# Patient Record
Sex: FEMALE | Race: WHITE
[De-identification: names, ages, dates, MRNs, and addresses within clinical notes are randomized per-mention and may not be internally consistent; named-entity substitution may affect disease eponyms.]

---

## 2018-07-26 ENCOUNTER — HOSPITAL ENCOUNTER (EMERGENCY)
Dept: HOSPITAL 92 - ERS | Age: 23
Discharge: HOME | End: 2018-07-26
Payer: COMMERCIAL

## 2018-07-26 DIAGNOSIS — F41.9: ICD-10-CM

## 2018-07-26 DIAGNOSIS — O20.8: Primary | ICD-10-CM

## 2018-07-26 DIAGNOSIS — Z87.891: ICD-10-CM

## 2018-07-26 DIAGNOSIS — Z3A.13: ICD-10-CM

## 2018-07-26 DIAGNOSIS — F31.9: ICD-10-CM

## 2018-07-26 DIAGNOSIS — O99.341: ICD-10-CM

## 2018-07-26 LAB
BASOPHILS # BLD AUTO: 0 THOU/UL (ref 0–0.2)
BASOPHILS NFR BLD AUTO: 0.4 % (ref 0–1)
EOSINOPHIL # BLD AUTO: 0.1 THOU/UL (ref 0–0.7)
EOSINOPHIL NFR BLD AUTO: 0.8 % (ref 0–10)
HCG SERPL QL: POSITIVE
HGB BLD-MCNC: 12.5 G/DL (ref 12–16)
LYMPHOCYTES # BLD: 1.8 THOU/UL (ref 1.2–3.4)
LYMPHOCYTES NFR BLD AUTO: 19.2 % (ref 21–51)
MCH RBC QN AUTO: 30.6 PG (ref 27–31)
MCV RBC AUTO: 89.8 FL (ref 78–98)
MONOCYTES # BLD AUTO: 0.6 THOU/UL (ref 0.11–0.59)
MONOCYTES NFR BLD AUTO: 6.4 % (ref 0–10)
NEUTROPHILS # BLD AUTO: 6.7 THOU/UL (ref 1.4–6.5)
NEUTROPHILS NFR BLD AUTO: 73.1 % (ref 42–75)
PLATELET # BLD AUTO: 157 THOU/UL (ref 130–400)
PREGS CONTROL BACKGROUND?: (no result)
PREGS CONTROL BAR APPEAR?: YES
RBC # BLD AUTO: 4.08 MILL/UL (ref 4.2–5.4)
WBC # BLD AUTO: 9.1 THOU/UL (ref 4.8–10.8)

## 2018-07-26 PROCEDURE — 86901 BLOOD TYPING SEROLOGIC RH(D): CPT

## 2018-07-26 PROCEDURE — 76856 US EXAM PELVIC COMPLETE: CPT

## 2018-07-26 PROCEDURE — 87491 CHLMYD TRACH DNA AMP PROBE: CPT

## 2018-07-26 PROCEDURE — 85025 COMPLETE CBC W/AUTO DIFF WBC: CPT

## 2018-07-26 PROCEDURE — 96365 THER/PROPH/DIAG IV INF INIT: CPT

## 2018-07-26 PROCEDURE — 84702 CHORIONIC GONADOTROPIN TEST: CPT

## 2018-07-26 PROCEDURE — 87510 GARDNER VAG DNA DIR PROBE: CPT

## 2018-07-26 PROCEDURE — 87591 N.GONORRHOEAE DNA AMP PROB: CPT

## 2018-07-26 PROCEDURE — 84703 CHORIONIC GONADOTROPIN ASSAY: CPT

## 2018-07-26 PROCEDURE — 96375 TX/PRO/DX INJ NEW DRUG ADDON: CPT

## 2018-07-26 PROCEDURE — 87660 TRICHOMONAS VAGIN DIR PROBE: CPT

## 2018-07-26 PROCEDURE — 86900 BLOOD TYPING SEROLOGIC ABO: CPT

## 2018-07-26 PROCEDURE — 87480 CANDIDA DNA DIR PROBE: CPT

## 2018-07-26 PROCEDURE — 36415 COLL VENOUS BLD VENIPUNCTURE: CPT

## 2018-07-26 NOTE — ULT
PELVIC ULTRASOUND

7/26/18

 

HISTORY: 

Vaginal bleeding and pelvic pain in a patient with positive pregnancy

 

FINDINGS:  

Multiple transabdominal sonographic image of the pelvis are obtained. 

 

There is evidence of a single intrauterine gestation in transverse lie. Cardiac doppler does demonstr
ate fetal heart tones with a fetal heart rate of 147 beats per minute. The placenta is located anteri
tea and at the fundus without evidence of a retroplacental hemorrhage. There is a hypoechoic area se
en at the lower uterine segment adjacent to the cervix which  measures 4 cm x 1.5 cm x 4.2 cm. This d
oes not demonstrate flow on color flow evaluation. 

 

The findings are worrisome for subchorionic hemorrhage in this location. 

 

Subjectively, there is a normal amount of amniotic fluid. The cervical length measures 4 cm. 

 

FETAL MEASUREMENTS:

Biparietal diameter      2.34 cm      13 weeks, 6 days

Head circumference      8.9 cm      14 weeks

Abdominal circumference      7.5 cm      14 weeks

Femur length            1.07 cm      13 weeks, 1 day

 

The estimated gestational age by ultrasound is 13 weeks and 4 days with an SOFI on 1/27/19. Gestationa
l age by the last menstrual period is not provided. 

 

The right ovary is not visualized. The left ovary is visualized measuring 3.2 cm x 2 cm x 1.9 cm. Dop
pler evaluation with spectral analysis and color flow evaluation left ovary does demonstrate arterial
 flow. No free fluid is seen in the cul-de-sac.

 

IMPRESSION:  

1.      Findings suggestive of a subchorionic hemorrhage in the lower uterine segment and adjacent to
 and covering region of the cervical os with greatest transverse measurement of 4.2 cm. 

2.      Single intrauterine gestation in transverse lie with fetal heart tones documented. 

3.      No evidence of a retroplacental hemorrhage. 

4.      Gestational age by ultrasound is 13 weeks and 4 days with an SOFI on 1/27/19. 

 

Above findings discussed with Aga Estrada in the Emergency Department on 7/26/18 at 16:11 hours.

 

POS: [facility]